# Patient Record
Sex: MALE | Race: WHITE | Employment: FULL TIME | URBAN - METROPOLITAN AREA
[De-identification: names, ages, dates, MRNs, and addresses within clinical notes are randomized per-mention and may not be internally consistent; named-entity substitution may affect disease eponyms.]

---

## 2023-07-23 ENCOUNTER — HOSPITAL ENCOUNTER (EMERGENCY)
Age: 48
Discharge: HOME | End: 2023-07-23
Attending: EMERGENCY MEDICINE
Payer: COMMERCIAL

## 2023-07-23 VITALS
WEIGHT: 184.4 LBS | BODY MASS INDEX: 28.94 KG/M2 | HEIGHT: 67 IN | TEMPERATURE: 97.7 F | HEART RATE: 80 BPM | DIASTOLIC BLOOD PRESSURE: 96 MMHG | OXYGEN SATURATION: 98 % | RESPIRATION RATE: 18 BRPM | SYSTOLIC BLOOD PRESSURE: 165 MMHG

## 2023-07-23 DIAGNOSIS — H60.392 INFECTIVE OTITIS EXTERNA OF LEFT EAR: Primary | ICD-10-CM

## 2023-07-23 PROCEDURE — 6370000000 HC RX 637 (ALT 250 FOR IP): Performed by: EMERGENCY MEDICINE

## 2023-07-23 PROCEDURE — 99283 EMERGENCY DEPT VISIT LOW MDM: CPT

## 2023-07-23 RX ORDER — CIPROFLOXACIN AND DEXAMETHASONE 3; 1 MG/ML; MG/ML
4 SUSPENSION/ DROPS AURICULAR (OTIC) 2 TIMES DAILY
Qty: 7.5 ML | Refills: 0 | Status: SHIPPED | OUTPATIENT
Start: 2023-07-23 | End: 2023-07-30

## 2023-07-23 RX ORDER — ACETAMINOPHEN 500 MG
1000 TABLET ORAL ONCE
Status: COMPLETED | OUTPATIENT
Start: 2023-07-23 | End: 2023-07-23

## 2023-07-23 RX ADMIN — ACETAMINOPHEN 1000 MG: 500 TABLET ORAL at 02:16

## 2023-07-23 ASSESSMENT — LIFESTYLE VARIABLES
HOW OFTEN DO YOU HAVE A DRINK CONTAINING ALCOHOL: NEVER
HOW MANY STANDARD DRINKS CONTAINING ALCOHOL DO YOU HAVE ON A TYPICAL DAY: PATIENT DOES NOT DRINK

## 2023-07-23 ASSESSMENT — PAIN DESCRIPTION - PAIN TYPE: TYPE: ACUTE PAIN

## 2023-07-23 ASSESSMENT — PAIN DESCRIPTION - LOCATION: LOCATION: EAR

## 2023-07-23 ASSESSMENT — PAIN DESCRIPTION - ORIENTATION: ORIENTATION: LEFT

## 2023-07-23 ASSESSMENT — PAIN SCALES - GENERAL
PAINLEVEL_OUTOF10: 7
PAINLEVEL_OUTOF10: 6

## 2023-07-23 ASSESSMENT — PAIN DESCRIPTION - FREQUENCY: FREQUENCY: CONTINUOUS

## 2023-07-23 ASSESSMENT — PAIN DESCRIPTION - ONSET: ONSET: ON-GOING

## 2023-07-23 ASSESSMENT — PAIN DESCRIPTION - DESCRIPTORS: DESCRIPTORS: ACHING;DISCOMFORT

## 2023-07-23 ASSESSMENT — PAIN - FUNCTIONAL ASSESSMENT: PAIN_FUNCTIONAL_ASSESSMENT: 0-10

## 2023-07-23 NOTE — DISCHARGE INSTRUCTIONS
You were seen in the hospital for:  1. Infective otitis externa of left ear      Your evaluation found:  -Your exam is consistent with otitis externa, this is an infection of the outside of the ear canal    You were treated with:  -None    Specific instructions for discharge:  -Please use the Ciprodex eardrops, 4 drops in your left ear twice a day for the next week to help treat the infection  -Please do not put anything else in your ear including any other types of drops as that can irritate the skin which is already fighting infection    Results to be followed up on:  -None    Follow up with:  -Your primary care physician within 1 week for recheck of symptoms    You should return to the emergency department if your symptoms worsen or do not resolve.  In addition, return if:  -You have a fever (greater than 101 degrees)  -You have chest pain, shortness of breath, abdominal pain, or uncontrollable vomiting  -You are unable to eat or drink  -You pass out  -You have difficulty moving your arms or legs   -You have difficulty speaking or slurred speech  -Or you have any concern that you feel needs acute physician evaluation

## 2023-07-23 NOTE — ED PROVIDER NOTES
ED Attending Attestation Note     Date of evaluation: 7/23/2023    This patient was seen by the resident. I have seen and examined the patient, agree with the workup, evaluation, management and diagnosis. The care plan has been discussed. My assessment reveals complaints of pain to the left ear. Patient does have swelling of external auditory canal with no erythema to the pinna tenderness to percussion of the mastoid air cells. Will place on antibiotic eardrops.      Kylee Huber MD  07/23/23 [id number]

## 2023-07-23 NOTE — ED PROVIDER NOTES
[facility]          EM RESIDENT NOTE       Date of evaluation: 7/23/2023    Chief Complaint     Otalgia (C/o left ear pain started tonight, wears hearing aid. Patient states wife tried a drop but burning pain. Denies any drainage )    History of Present Illness     Cj Gustafson is a pleasant 46 y.o. male with history of hypertension who presents with left ear pain. Patient states that when he woke up this morning, he noticed mildly decreased hearing in his left year. He states that the ear canal felt \"swollen\". He uses hearing aids but states that this did not provide any improvement in his hearing. There was some mild discomfort as well. His wife recommended instilling incense into his ear canal which resulted in severe burning pain which has since mostly subsided. He reports an episode of otitis externa last year, no recent episodes of AOM. He denies any fevers or chills. No cough, congestion, or conjunctivitis. No chest pain or difficulty breathing. No history of diabetes. No known antibiotic allergies. The patient denies any aggravating or alleviating factors or associated symptoms other than discussed above. Review of Systems     A comprehensive review of systems was performed and negative except as noted previously in the HPI. Past Medical, Surgical, Family, and Social History     He has a past medical history of Hypertension. He has a past surgical history that includes Cholecystectomy and Clavicle surgery (Left). His family history is not on file. He reports that he has never smoked. He has never used smokeless tobacco. He reports that he does not drink alcohol and does not use drugs. Medications     Discharge Medication List as of 7/23/2023  2:08 AM        CONTINUE these medications which have NOT CHANGED    Details   UNKNOWN TO PATIENT Blood pressure pill dailyHistorical Med           Allergies     He has No Known Allergies.     Physical Exam

## 2023-07-23 NOTE — ED NOTES
Pt discharged to home, alert and oriented. Denies any questions about discharge instructions. Will follow up as directed. encouraged to return for any worsening symptoms.         Moe Bull RN  07/23/23 [id number]